# Patient Record
Sex: FEMALE | Race: WHITE | URBAN - NONMETROPOLITAN AREA
[De-identification: names, ages, dates, MRNs, and addresses within clinical notes are randomized per-mention and may not be internally consistent; named-entity substitution may affect disease eponyms.]

---

## 2017-01-13 ENCOUNTER — OFFICE VISIT (OUTPATIENT)
Dept: FAMILY MEDICINE CLINIC | Facility: CLINIC | Age: 27
End: 2017-01-13

## 2017-01-13 VITALS
BODY MASS INDEX: 38.11 KG/M2 | WEIGHT: 240 LBS | HEIGHT: 66.5 IN | OXYGEN SATURATION: 98 % | TEMPERATURE: 99 F | SYSTOLIC BLOOD PRESSURE: 120 MMHG | HEART RATE: 94 BPM | DIASTOLIC BLOOD PRESSURE: 80 MMHG

## 2017-01-13 DIAGNOSIS — M72.2 PLANTAR FASCIITIS OF RIGHT FOOT: Primary | ICD-10-CM

## 2017-01-13 PROCEDURE — 99213 OFFICE O/P EST LOW 20 MIN: CPT | Performed by: FAMILY MEDICINE

## 2017-01-13 NOTE — PROGRESS NOTES
Vee Guzmán is a 31year old female. Patient presents with: Other: check up--hasnt been here in over 1 year. ..room 1      HPI:   Patient has mild pain to her right heel. It is worse when she first gets up.   Gets better after she has been moving about L TM normal, Pharynx normal  NECK: supple, no cervical adenopathy  LUNGS: clear to auscultation  CARDIO: RRR without murmur  ABD soft, nontender, normal BS, no masses, rebound or guarding. No organomegaly or CVA tenderness.   EXTREMITIES: no edema, pain to

## 2017-06-27 ENCOUNTER — TELEPHONE (OUTPATIENT)
Dept: FAMILY MEDICINE CLINIC | Facility: CLINIC | Age: 27
End: 2017-06-27

## 2017-06-27 ENCOUNTER — OFFICE VISIT (OUTPATIENT)
Dept: FAMILY MEDICINE CLINIC | Facility: CLINIC | Age: 27
End: 2017-06-27

## 2017-06-27 VITALS
HEART RATE: 82 BPM | SYSTOLIC BLOOD PRESSURE: 126 MMHG | WEIGHT: 245.38 LBS | TEMPERATURE: 100 F | DIASTOLIC BLOOD PRESSURE: 86 MMHG | OXYGEN SATURATION: 99 % | BODY MASS INDEX: 38.97 KG/M2 | HEIGHT: 66.5 IN

## 2017-06-27 DIAGNOSIS — S69.92XD: Primary | ICD-10-CM

## 2017-06-27 PROCEDURE — 99213 OFFICE O/P EST LOW 20 MIN: CPT | Performed by: FAMILY MEDICINE

## 2017-06-27 NOTE — TELEPHONE ENCOUNTER
Cell phone [phone]    Pt was seen last night in [address] [place] [address] for a swollen finger, had her ring cut off. Finger is still swollen. Urgent care said it looked like a jam. Pt needs to be seen today for work.  Can she be fit in?

## 2017-06-27 NOTE — PROGRESS NOTES
Luna Bloch is a 28year old female. Patient presents with:  Finger Pain: possible workers comp. Imani Carney left ring finger. . pt hit finger at work. . swelling. . burgess adan er to get rings cut off. . room 5      HPI:   Jammed finger at work  States it is better rom      ASSESSMENT AND PLAN:     Jammed interphalangeal joint of finger of left hand, subsequent encounter  (primary encounter diagnosis)    Problem List Items Addressed This Visit     None      Visit Diagnoses     Jammed interphalangeal joint of finger

## 2017-06-27 NOTE — TELEPHONE ENCOUNTER
Patient worked in, left message with family member , who will notify her via Text message.        Future Appointments  Date Time Provider Monica Christian   6/27/2017 2:40 PM Maykel Gonzalez MD [facility] [place]

## 2017-06-29 NOTE — PATIENT INSTRUCTIONS
Finger Sprain  A sprain is a stretching or tearing of the ligaments that hold a joint together. There are no broken bones. Sprains take 3 to 6 weeks to heal.    A sprained finger may be treated with a splint or jackie tape.  This is when you tape the injur Follow up with your healthcare provider as directed. Finger joints will become stiff if immobile for too long. If a splint was applied, ask your healthcare provider when it is safe to begin range-of-motion exercises.   Sometimes fractures don’t show up on t

## 2017-07-10 ENCOUNTER — TELEPHONE (OUTPATIENT)
Dept: FAMILY MEDICINE CLINIC | Facility: CLINIC | Age: 27
End: 2017-07-10

## 2017-07-31 ENCOUNTER — OFFICE VISIT (OUTPATIENT)
Dept: FAMILY MEDICINE CLINIC | Facility: CLINIC | Age: 27
End: 2017-07-31

## 2017-07-31 VITALS
DIASTOLIC BLOOD PRESSURE: 80 MMHG | BODY MASS INDEX: 38 KG/M2 | TEMPERATURE: 100 F | SYSTOLIC BLOOD PRESSURE: 130 MMHG | WEIGHT: 241.13 LBS

## 2017-07-31 DIAGNOSIS — J01.90 ACUTE RHINOSINUSITIS: Primary | ICD-10-CM

## 2017-07-31 PROCEDURE — 99213 OFFICE O/P EST LOW 20 MIN: CPT | Performed by: FAMILY MEDICINE

## 2017-07-31 RX ORDER — AZITHROMYCIN 250 MG/1
TABLET, FILM COATED ORAL
Qty: 6 TABLET | Refills: 0 | Status: SHIPPED | OUTPATIENT
Start: 2017-07-31 | End: 2017-08-05

## 2017-07-31 NOTE — PROGRESS NOTES
Sherry Anderson is a 28year old female. Patient presents with:  Headache: Fever since Friday. . 101.3 --99 degrees. . headache on right side of head and temple since Monday. . bug bite last Sat, red halo around area. .  room 6      HPI:   Fever for 3 days. exertion  CARDIOVASCULAR: denies chest pain on exertion  GI: denies abdominal pain and denies heartburn  NEURO: denies headaches    EXAM:   /80   Temp 100.2 °F (37.9 °C) (Temporal)   Wt 241 lb 2 oz   BMI 38.34 kg/m²  Body mass index is 38.34 kg/m².

## 2017-08-01 ENCOUNTER — TELEPHONE (OUTPATIENT)
Dept: FAMILY MEDICINE CLINIC | Facility: CLINIC | Age: 27
End: 2017-08-01

## 2017-11-04 ENCOUNTER — OFFICE VISIT (OUTPATIENT)
Dept: FAMILY MEDICINE CLINIC | Facility: CLINIC | Age: 27
End: 2017-11-04

## 2017-11-04 VITALS
TEMPERATURE: 98 F | HEIGHT: 66 IN | SYSTOLIC BLOOD PRESSURE: 122 MMHG | OXYGEN SATURATION: 99 % | WEIGHT: 235 LBS | BODY MASS INDEX: 37.77 KG/M2 | DIASTOLIC BLOOD PRESSURE: 80 MMHG

## 2017-11-04 DIAGNOSIS — K21.9 GERD WITHOUT ESOPHAGITIS: ICD-10-CM

## 2017-11-04 DIAGNOSIS — R05.9 COUGH: Primary | ICD-10-CM

## 2017-11-04 PROCEDURE — 99213 OFFICE O/P EST LOW 20 MIN: CPT | Performed by: FAMILY MEDICINE

## 2017-11-04 RX ORDER — ALBUTEROL SULFATE 90 UG/1
2 AEROSOL, METERED RESPIRATORY (INHALATION) EVERY 6 HOURS PRN
Qty: 1 INHALER | Refills: 2 | Status: SHIPPED | OUTPATIENT
Start: 2017-11-04 | End: 2019-09-27

## 2017-11-06 NOTE — PATIENT INSTRUCTIONS
Chronic Cough with Uncertain Cause (Adult)    Everyone has had a cough as part of the common cold, flu, or bronchitis. This kind of cough occurs along with an achy feeling, low-grade fever, nasal and sinus congestion, and a scratchy or sore throat.  This Let your healthcare provider know if you are taking any of these. Asthma  Cough may be the only sign of mild asthma. You may have tests to find out if asthma is causing your cough. You may also take asthma medicine on a trial basis.   Acid reflux (heartbur © 9761-9793 [employer] [address]. [address] [facility], [facility] [place]. All rights reserved. This information is not intended as a substitute for professional medical care. Always follow your healthcare professional's instructions.         Chronic · ACE inhibitors for high blood pressure. These include benazepril, captopril, enalapril, fosinopril, lisinopril, quinapril, ramipril, and others. · Beta-blockers for high blood pressure and other conditions.  These include propranolol, atenolol, metoprolo · Coughing up blood  · Moderate to severe trouble breathing or wheezing  Date Last Reviewed: 9/13/2015  © 5015-8378 [employer] [address]. [address] [facility], [address] [place] [address]. All rights reserved.  This information is not intended as a substitute

## 2017-11-06 NOTE — PROGRESS NOTES
Haley Mane is a 28year old female. Patient presents with:  Wellness Visit: room 5.        HPI:   Here for wewllness    Has been allergic and the inhaler has helped    She needs a fill    They will have to fly to [place]   For a family     Feels wel and throat are clear  NECK: supple,no adenopathy,no bruits  LUNGS: clear to auscultation  CARDIO: RRR without murmur  EXTREMITIES: no cyanosis, clubbing or edema    ASSESSMENT AND PLAN:     Cough  (primary encounter diagnosis)  Gerd without esophagitis

## 2018-07-03 ENCOUNTER — OFFICE VISIT (OUTPATIENT)
Dept: FAMILY MEDICINE CLINIC | Facility: CLINIC | Age: 28
End: 2018-07-03

## 2018-07-03 VITALS
BODY MASS INDEX: 38 KG/M2 | TEMPERATURE: 99 F | DIASTOLIC BLOOD PRESSURE: 80 MMHG | WEIGHT: 236 LBS | HEART RATE: 78 BPM | SYSTOLIC BLOOD PRESSURE: 132 MMHG | OXYGEN SATURATION: 98 %

## 2018-07-03 DIAGNOSIS — J01.90 ACUTE RHINOSINUSITIS: Primary | ICD-10-CM

## 2018-07-03 PROCEDURE — 99213 OFFICE O/P EST LOW 20 MIN: CPT | Performed by: FAMILY MEDICINE

## 2018-07-03 RX ORDER — AZITHROMYCIN 250 MG/1
TABLET, FILM COATED ORAL
Qty: 6 TABLET | Refills: 0 | Status: SHIPPED | OUTPATIENT
Start: 2018-07-03 | End: 2018-07-08

## 2018-07-03 NOTE — PATIENT INSTRUCTIONS
.  Azithromycin tablets  Brand Names: Zithromax, Zithromax Tri-Charlie, Zithromax Z-Charlie  What is this medicine? AZITHROMYCIN (az ith santosh MYE sin) is a macrolide antibiotic. It is used to treat or prevent certain kinds of bacterial infections.  It will not work This medicine may also interact with the following medications:  · amiodarone  · antacids  · birth control pills  · cyclosporine  · digoxin  · magnesium  · nelfinavir  · phenytoin  · warfarin  What if I miss a dose?   If you miss a dose, take it as soon as

## 2018-07-03 NOTE — PROGRESS NOTES
HPI:   Kem Barry is a 33year old female who presents for upper respiratory symptoms for  5  days.  Patient reports sore throat only at the beginning of sx's, congestion, low grade fever, dry cough, cough is not keeping pt up at night, ear pain, sinus rashes,no suspicious lesions  EYES:PERRLA, EOMI,conjunctiva are clear  HEENT: atraumatic, normocephalic,ears are clear,  and throat   Mildly red  No exudate  No petechiae   mild postnasal drainage  NECK: supple,no adenopathy,no bruits  LUNGS: clear to ausc

## 2018-07-06 ENCOUNTER — TELEPHONE (OUTPATIENT)
Dept: FAMILY MEDICINE CLINIC | Facility: CLINIC | Age: 28
End: 2018-07-06

## 2018-07-06 RX ORDER — AMOXICILLIN AND CLAVULANATE POTASSIUM 875; 125 MG/1; MG/1
1 TABLET, FILM COATED ORAL 2 TIMES DAILY
Qty: 20 TABLET | Refills: 0 | Status: SHIPPED | OUTPATIENT
Start: 2018-07-06 | End: 2018-07-16

## 2018-07-06 RX ORDER — PREDNISONE 20 MG/1
TABLET ORAL
Qty: 30 TABLET | Refills: 0 | Status: SHIPPED | OUTPATIENT
Start: 2018-07-06 | End: 2019-09-03 | Stop reason: ALTCHOICE

## 2018-07-06 NOTE — TELEPHONE ENCOUNTER
ANTIBIOTIC IS NOT HELPING, SORE THROAT IS WORSE  PLEASE ADVISE PATIENT CELL  OR MOTHER SHANNEN [phone]

## 2018-07-06 NOTE — TELEPHONE ENCOUNTER
No allergies, change antibiotic to Augmentin 875 mg twice daily 10 days, discontinue Z-Charlie    Add prednisone taper    Prednisone 20mg  3 tabs daily 5 days 2 tabs daily 5 days 1 tab daily 5 days

## 2018-07-06 NOTE — TELEPHONE ENCOUNTER
Taking Tylenol for sinuses as well as Z-Pack. Headache is gone. Right ear starting to bother her. Has dry cough. Sore throat no worse in morning. Feels like she has a lot of drainage. Feels like it's on fire.   Has tried mints, cough drops, gum, but n

## 2019-06-24 ENCOUNTER — OFFICE VISIT (OUTPATIENT)
Dept: FAMILY MEDICINE CLINIC | Facility: CLINIC | Age: 29
End: 2019-06-24
Payer: COMMERCIAL

## 2019-06-24 VITALS
HEIGHT: 65.75 IN | RESPIRATION RATE: 20 BRPM | BODY MASS INDEX: 38.87 KG/M2 | TEMPERATURE: 99 F | WEIGHT: 239 LBS | HEART RATE: 92 BPM | DIASTOLIC BLOOD PRESSURE: 80 MMHG | SYSTOLIC BLOOD PRESSURE: 138 MMHG

## 2019-06-24 DIAGNOSIS — R79.89 ELEVATED LFTS: Primary | ICD-10-CM

## 2019-06-24 DIAGNOSIS — K76.0 FATTY LIVER DISEASE, NONALCOHOLIC: ICD-10-CM

## 2019-06-24 PROCEDURE — 99213 OFFICE O/P EST LOW 20 MIN: CPT | Performed by: FAMILY MEDICINE

## 2019-06-24 NOTE — PROGRESS NOTES
Ju Ayala is a 26year old female. Patient presents with:  Lab Results: done at work , pt has copy in the room .  inrm 5      Chief Complaint Reviewed and Verified  Nursing Notes Reviewed and   Verified  Tobacco Reviewed  Allergies Reviewed  [address] [facility] [address] - 56 U/L 74 (A)       ALLERGIES:  No Known Allergies        Current Outpatient Medications on File Prior to Visit:  predniSONE 20 MG Oral Tab 3 tabs daily 5 days 2 tabs daily 5 days 1 tab daily 5 days Disp: 30 tablet Rfl: 0   Albuterol Sulfate  ( apparent distress  SKIN: no rashes,no suspicious lesions  No pallor jaundice  No icterus  HEENT: atraumatic, normocephalic,ears and throat are clear    GI: good BS's,no masses, HSM or tenderness  EXTREMITIES: no cyanosis, clubbing or edema     ASSESSMENT A

## 2019-09-03 ENCOUNTER — OFFICE VISIT (OUTPATIENT)
Dept: FAMILY MEDICINE CLINIC | Facility: CLINIC | Age: 29
End: 2019-09-03
Payer: COMMERCIAL

## 2019-09-03 VITALS
SYSTOLIC BLOOD PRESSURE: 128 MMHG | WEIGHT: 237.13 LBS | OXYGEN SATURATION: 99 % | BODY MASS INDEX: 39 KG/M2 | RESPIRATION RATE: 22 BRPM | HEART RATE: 96 BPM | TEMPERATURE: 99 F | DIASTOLIC BLOOD PRESSURE: 80 MMHG

## 2019-09-03 DIAGNOSIS — J01.40 ACUTE NON-RECURRENT PANSINUSITIS: Primary | ICD-10-CM

## 2019-09-03 PROCEDURE — 99213 OFFICE O/P EST LOW 20 MIN: CPT | Performed by: NURSE PRACTITIONER

## 2019-09-03 RX ORDER — AMOXICILLIN AND CLAVULANATE POTASSIUM 875; 125 MG/1; MG/1
1 TABLET, FILM COATED ORAL 2 TIMES DAILY
Qty: 20 TABLET | Refills: 0 | Status: SHIPPED | OUTPATIENT
Start: 2019-09-03 | End: 2019-09-13

## 2019-09-03 NOTE — PROGRESS NOTES
HPI:   Nasal Congestion   This is a new problem. Episode onset: Saturday. The problem has been gradually worsening. Associated symptoms include congestion, coughing, fatigue, a sore throat and swollen glands.  Pertinent negatives include no chest pain, ch pain and palpitations. Musculoskeletal: Negative for myalgias.         EXAM:   /80 (BP Location: Right arm, Patient Position: Sitting, Cuff Size: large)   Pulse 96   Temp 98.5 °F (36.9 °C) (Temporal)   Resp 22   Wt 237 lb 2 oz   SpO2 99%   BMI 38.56

## 2019-09-27 ENCOUNTER — OFFICE VISIT (OUTPATIENT)
Dept: FAMILY MEDICINE CLINIC | Facility: CLINIC | Age: 29
End: 2019-09-27
Payer: COMMERCIAL

## 2019-09-27 VITALS
SYSTOLIC BLOOD PRESSURE: 130 MMHG | DIASTOLIC BLOOD PRESSURE: 80 MMHG | HEIGHT: 65.75 IN | TEMPERATURE: 98 F | BODY MASS INDEX: 38.73 KG/M2 | HEART RATE: 80 BPM | WEIGHT: 238.13 LBS

## 2019-09-27 DIAGNOSIS — J01.00 ACUTE NON-RECURRENT MAXILLARY SINUSITIS: Primary | ICD-10-CM

## 2019-09-27 PROCEDURE — 99213 OFFICE O/P EST LOW 20 MIN: CPT | Performed by: FAMILY MEDICINE

## 2019-09-27 RX ORDER — ALBUTEROL SULFATE 90 UG/1
2 AEROSOL, METERED RESPIRATORY (INHALATION) EVERY 6 HOURS PRN
Qty: 1 INHALER | Refills: 2 | Status: SHIPPED | OUTPATIENT
Start: 2019-09-27

## 2019-09-27 RX ORDER — AMOXICILLIN 875 MG/1
875 TABLET, COATED ORAL 2 TIMES DAILY
Qty: 20 TABLET | Refills: 0 | Status: SHIPPED | OUTPATIENT
Start: 2019-09-27 | End: 2019-12-14 | Stop reason: ALTCHOICE

## 2019-09-27 NOTE — PROGRESS NOTES
Dahiana Brewer is a 26year old female. Patient presents with:  Sore Throat: left side throat pain; right ear pain; started Sunday with sore throat. . room 1      HPI:   C/o cough, sore throat, sinus pressure, ear pressure. No fever, chills, sweats.  No wh nontender, normal BS, no masses, rebound or guarding. No organomegaly or CVA tenderness.   EXTREMITIES: no edema          ASSESSMENT AND PLAN:     Acute non-recurrent maxillary sinusitis  (primary encounter diagnosis)    Explained majority of upper respirat

## 2019-10-28 ENCOUNTER — MED REC SCAN ONLY (OUTPATIENT)
Dept: FAMILY MEDICINE CLINIC | Facility: CLINIC | Age: 29
End: 2019-10-28

## 2019-12-14 ENCOUNTER — OFFICE VISIT (OUTPATIENT)
Dept: FAMILY MEDICINE CLINIC | Facility: CLINIC | Age: 29
End: 2019-12-14
Payer: COMMERCIAL

## 2019-12-14 VITALS
HEART RATE: 84 BPM | DIASTOLIC BLOOD PRESSURE: 72 MMHG | WEIGHT: 232.13 LBS | TEMPERATURE: 98 F | SYSTOLIC BLOOD PRESSURE: 118 MMHG | BODY MASS INDEX: 36.86 KG/M2 | RESPIRATION RATE: 14 BRPM | HEIGHT: 66.5 IN

## 2019-12-14 DIAGNOSIS — R79.89 ELEVATED LFTS: ICD-10-CM

## 2019-12-14 DIAGNOSIS — E66.9 OBESITY (BMI 35.0-39.9 WITHOUT COMORBIDITY): ICD-10-CM

## 2019-12-14 DIAGNOSIS — K76.0 FATTY LIVER DISEASE, NONALCOHOLIC: Primary | ICD-10-CM

## 2019-12-14 PROCEDURE — 80053 COMPREHEN METABOLIC PANEL: CPT | Performed by: FAMILY MEDICINE

## 2019-12-14 PROCEDURE — 99213 OFFICE O/P EST LOW 20 MIN: CPT | Performed by: FAMILY MEDICINE

## 2019-12-14 PROCEDURE — 36415 COLL VENOUS BLD VENIPUNCTURE: CPT | Performed by: FAMILY MEDICINE

## 2019-12-14 NOTE — PROGRESS NOTES
Jocelynn Adair is a 26year old female.   Patient presents with:  Lab: room 5      Chief Complaint Reviewed and Verified  Nursing Notes Reviewed and   Verified  Tobacco Reviewed  Allergies Reviewed  Medications Reviewed    Problem List Reviewed  Medical Hi shortness of breath with exertion  CARDIOVASCULAR: denies chest pain on exertion  GI: denies abdominal pain and denies heartburn  NEURO: denies headaches     Objective   EXAM:   /72 (BP Location: Right arm, Patient Position: Sitting, Cuff Size: large

## 2019-12-16 DIAGNOSIS — K76.0 FATTY LIVER DISEASE, NONALCOHOLIC: Primary | ICD-10-CM

## 2020-01-10 ENCOUNTER — OFFICE VISIT (OUTPATIENT)
Dept: FAMILY MEDICINE CLINIC | Facility: CLINIC | Age: 30
End: 2020-01-10
Payer: COMMERCIAL

## 2020-01-10 VITALS
TEMPERATURE: 98 F | OXYGEN SATURATION: 98 % | WEIGHT: 238.38 LBS | HEIGHT: 66.5 IN | SYSTOLIC BLOOD PRESSURE: 130 MMHG | BODY MASS INDEX: 37.86 KG/M2 | HEART RATE: 80 BPM | DIASTOLIC BLOOD PRESSURE: 80 MMHG

## 2020-01-10 DIAGNOSIS — J01.40 ACUTE NON-RECURRENT PANSINUSITIS: Primary | ICD-10-CM

## 2020-01-10 PROCEDURE — 99213 OFFICE O/P EST LOW 20 MIN: CPT | Performed by: FAMILY MEDICINE

## 2020-01-10 RX ORDER — AMOXICILLIN 875 MG/1
875 TABLET, COATED ORAL 2 TIMES DAILY
Qty: 20 TABLET | Refills: 0 | Status: SHIPPED | OUTPATIENT
Start: 2020-01-10 | End: 2020-06-27 | Stop reason: ALTCHOICE

## 2020-01-10 NOTE — PROGRESS NOTES
Matteo La     is a 26year old female. Patient presents with:  Ear Pain: RT ear pain, started this am- pain goes down neck. ..room 1  Sinus Problem: also pressure in sinuses      HPI:   Sinus congestion, pnd, sore throat, ear ache, cough.   Albuterol L TM normal, Pharynx with PND  NECK: supple, no cervical adenopathy  LUNGS: clear to auscultation  CARDIO: RRR without murmur.   EXTREMITIES: no edema          ASSESSMENT AND PLAN:     Acute non-recurrent pansinusitis  (primary encounter diagnosis)    Treat

## 2020-06-27 ENCOUNTER — OFFICE VISIT (OUTPATIENT)
Dept: FAMILY MEDICINE CLINIC | Facility: CLINIC | Age: 30
End: 2020-06-27
Payer: COMMERCIAL

## 2020-06-27 VITALS
TEMPERATURE: 99 F | DIASTOLIC BLOOD PRESSURE: 84 MMHG | HEIGHT: 66.5 IN | BODY MASS INDEX: 37.66 KG/M2 | OXYGEN SATURATION: 98 % | HEART RATE: 94 BPM | WEIGHT: 237.13 LBS | SYSTOLIC BLOOD PRESSURE: 138 MMHG | RESPIRATION RATE: 12 BRPM

## 2020-06-27 DIAGNOSIS — K21.9 GERD WITHOUT ESOPHAGITIS: ICD-10-CM

## 2020-06-27 DIAGNOSIS — S33.9XXA SPRAIN OF LIGAMENT OF LUMBOSACRAL JOINT, INITIAL ENCOUNTER: Primary | ICD-10-CM

## 2020-06-27 DIAGNOSIS — R79.89 ELEVATED LFTS: ICD-10-CM

## 2020-06-27 DIAGNOSIS — K76.0 FATTY LIVER DISEASE, NONALCOHOLIC: ICD-10-CM

## 2020-06-27 DIAGNOSIS — E66.9 OBESITY (BMI 35.0-39.9 WITHOUT COMORBIDITY): ICD-10-CM

## 2020-06-27 DIAGNOSIS — Z79.899 ENCOUNTER FOR LONG-TERM (CURRENT) USE OF MEDICATIONS: ICD-10-CM

## 2020-06-27 PROCEDURE — 36415 COLL VENOUS BLD VENIPUNCTURE: CPT | Performed by: FAMILY MEDICINE

## 2020-06-27 PROCEDURE — 80053 COMPREHEN METABOLIC PANEL: CPT | Performed by: FAMILY MEDICINE

## 2020-06-27 PROCEDURE — 99213 OFFICE O/P EST LOW 20 MIN: CPT | Performed by: FAMILY MEDICINE

## 2020-06-27 RX ORDER — CYCLOBENZAPRINE HCL 10 MG
10 TABLET ORAL 2 TIMES DAILY
Qty: 40 TABLET | Refills: 0 | Status: SHIPPED | OUTPATIENT
Start: 2020-06-27 | End: 2020-07-17

## 2020-06-27 NOTE — PATIENT INSTRUCTIONS
Cyclobenzaprine tablets  Brand Names: Fexmid, Flexeril  What is this medicine? CYCLOBENZAPRINE (sye kloe DONN za preen) is a muscle relaxer. It is used to treat muscle pain, spasms, and stiffness. How should I use this medicine?   Take this medicine by darci · certain medicines for infection like alfuzosin, chloroquine, clarithromycin, levofloxacin, mefloquine, pentamidine, troleandomycin  · certain medicines for irregular heart beat  · certain medicines for seizures like phenobarbital, primidone  · contrast d You may get drowsy or dizzy. Do not drive, use machinery, or do anything that needs mental alertness until you know how this medicine affects you. Do not stand or sit up quickly, especially if you are an older patient.  This reduces the risk of dizzy or gabi

## 2020-06-27 NOTE — PROGRESS NOTES
Jennifer Montano is a 31year old female. Patient presents with:  Test Results: .inrm.  8      Chief Complaint Reviewed and Verified  Nursing Notes Reviewed and   Verified  Tobacco Reviewed  Allergies Reviewed  Medications Reviewed    Problem List Reviewed (108 kg)  09/03/19 : 237 lb 2 oz (107.6 kg)  06/24/19 : 239 lb (108.4 kg)    Results for orders placed or performed in visit on 97/52/13   COMP METABOLIC PANEL (14)    Collection Time: 12/14/19  9:24 AM   Result Value Ref Range    Glucose 88 70 - 99 mg/dL Addressed This Visit        Unprioritized    Fatty liver disease, nonalcoholic    Current Assessment & Plan     Check labs today  No more than 4 g tylenol a day         Relevant Orders    COMP METABOLIC PANEL (14)      Other Visit Diagnoses     Sprain of l

## 2020-06-30 DIAGNOSIS — K76.0 FATTY LIVER DISEASE, NONALCOHOLIC: Primary | ICD-10-CM

## 2020-06-30 DIAGNOSIS — R79.89 ELEVATED LFTS: ICD-10-CM

## 2021-01-23 ENCOUNTER — OFFICE VISIT (OUTPATIENT)
Dept: FAMILY MEDICINE CLINIC | Facility: CLINIC | Age: 31
End: 2021-01-23
Payer: COMMERCIAL

## 2021-01-23 VITALS
OXYGEN SATURATION: 99 % | RESPIRATION RATE: 16 BRPM | BODY MASS INDEX: 37.08 KG/M2 | DIASTOLIC BLOOD PRESSURE: 78 MMHG | HEIGHT: 66.6 IN | WEIGHT: 233.5 LBS | HEART RATE: 88 BPM | TEMPERATURE: 98 F | SYSTOLIC BLOOD PRESSURE: 130 MMHG

## 2021-01-23 DIAGNOSIS — K76.0 FATTY LIVER DISEASE, NONALCOHOLIC: ICD-10-CM

## 2021-01-23 DIAGNOSIS — H65.01 NON-RECURRENT ACUTE SEROUS OTITIS MEDIA OF RIGHT EAR: Primary | ICD-10-CM

## 2021-01-23 LAB
ALBUMIN SERPL-MCNC: 3.7 G/DL (ref 3.4–5)
ALBUMIN/GLOB SERPL: 0.9 {RATIO} (ref 1–2)
ALP LIVER SERPL-CCNC: 38 U/L
ALT SERPL-CCNC: 72 U/L
ANION GAP SERPL CALC-SCNC: 5 MMOL/L (ref 0–18)
AST SERPL-CCNC: 38 U/L (ref 15–37)
BILIRUB SERPL-MCNC: 0.4 MG/DL (ref 0.1–2)
BUN BLD-MCNC: 10 MG/DL (ref 7–18)
BUN/CREAT SERPL: 12.2 (ref 10–20)
CALCIUM BLD-MCNC: 9.2 MG/DL (ref 8.5–10.1)
CHLORIDE SERPL-SCNC: 109 MMOL/L (ref 98–112)
CO2 SERPL-SCNC: 27 MMOL/L (ref 21–32)
CREAT BLD-MCNC: 0.82 MG/DL
GGT SERPL-CCNC: 33 U/L
GLOBULIN PLAS-MCNC: 4 G/DL (ref 2.8–4.4)
GLUCOSE BLD-MCNC: 90 MG/DL (ref 70–99)
M PROTEIN MFR SERPL ELPH: 7.7 G/DL (ref 6.4–8.2)
OSMOLALITY SERPL CALC.SUM OF ELEC: 291 MOSM/KG (ref 275–295)
PATIENT FASTING Y/N/NP: YES
POTASSIUM SERPL-SCNC: 4.3 MMOL/L (ref 3.5–5.1)
SODIUM SERPL-SCNC: 141 MMOL/L (ref 136–145)

## 2021-01-23 PROCEDURE — 36415 COLL VENOUS BLD VENIPUNCTURE: CPT | Performed by: FAMILY MEDICINE

## 2021-01-23 PROCEDURE — 82977 ASSAY OF GGT: CPT | Performed by: FAMILY MEDICINE

## 2021-01-23 PROCEDURE — 99213 OFFICE O/P EST LOW 20 MIN: CPT | Performed by: FAMILY MEDICINE

## 2021-01-23 PROCEDURE — 3075F SYST BP GE 130 - 139MM HG: CPT | Performed by: FAMILY MEDICINE

## 2021-01-23 PROCEDURE — 3078F DIAST BP <80 MM HG: CPT | Performed by: FAMILY MEDICINE

## 2021-01-23 PROCEDURE — 3008F BODY MASS INDEX DOCD: CPT | Performed by: FAMILY MEDICINE

## 2021-01-23 PROCEDURE — 80053 COMPREHEN METABOLIC PANEL: CPT | Performed by: FAMILY MEDICINE

## 2021-01-23 RX ORDER — PREDNISONE 20 MG/1
TABLET ORAL
Qty: 15 TABLET | Refills: 0 | Status: SHIPPED | OUTPATIENT
Start: 2021-01-23 | End: 2021-02-02

## 2021-01-23 RX ORDER — SULFAMETHOXAZOLE AND TRIMETHOPRIM 400; 80 MG/1; MG/1
1 TABLET ORAL 2 TIMES DAILY
Qty: 20 TABLET | Refills: 0 | Status: SHIPPED | OUTPATIENT
Start: 2021-01-23 | End: 2021-02-02

## 2021-01-23 NOTE — PROGRESS NOTES
Pooja Gupta is a 27year old female. Patient presents with:   Follow - Up: Follow up on liver and she needs right ear looked at feels like pressure in ear       Chief Complaint Reviewed and Verified  Nursing Notes Reviewed and   Verified  Tobacco Revie complaints  SKIN: denies any unusual skin lesions or rashes  ENT see HPI    RESPIRATORY: denies shortness of breath with exertion  CARDIOVASCULAR: denies chest pain on exertion  GI: denies abdominal pain and denies heartburn  NEURO: denies headaches     Ob issues and agrees to the plan.

## 2021-05-20 LAB
AMB EXT CHOLESTEROL, TOTAL: 181 MG/DL
AMB EXT HDL CHOLESTEROL: 68 MG/DL
AMB EXT LDL CHOLESTEROL, DIRECT: 85 MG/DL
AMB EXT TRIGLYCERIDES: 139 MG/DL

## 2021-07-10 ENCOUNTER — OFFICE VISIT (OUTPATIENT)
Dept: FAMILY MEDICINE CLINIC | Facility: CLINIC | Age: 31
End: 2021-07-10
Payer: COMMERCIAL

## 2021-07-10 VITALS
WEIGHT: 239.25 LBS | DIASTOLIC BLOOD PRESSURE: 78 MMHG | HEART RATE: 86 BPM | RESPIRATION RATE: 16 BRPM | HEIGHT: 66.6 IN | BODY MASS INDEX: 38 KG/M2 | SYSTOLIC BLOOD PRESSURE: 128 MMHG | TEMPERATURE: 98 F | OXYGEN SATURATION: 99 %

## 2021-07-10 DIAGNOSIS — R79.89 ELEVATED LFTS: Primary | ICD-10-CM

## 2021-07-10 DIAGNOSIS — K76.0 FATTY LIVER DISEASE, NONALCOHOLIC: ICD-10-CM

## 2021-07-10 DIAGNOSIS — F43.9 STRESS AT HOME: ICD-10-CM

## 2021-07-10 PROCEDURE — 99213 OFFICE O/P EST LOW 20 MIN: CPT | Performed by: FAMILY MEDICINE

## 2021-07-10 PROCEDURE — 3078F DIAST BP <80 MM HG: CPT | Performed by: FAMILY MEDICINE

## 2021-07-10 PROCEDURE — 3008F BODY MASS INDEX DOCD: CPT | Performed by: FAMILY MEDICINE

## 2021-07-10 PROCEDURE — 3074F SYST BP LT 130 MM HG: CPT | Performed by: FAMILY MEDICINE

## 2021-07-10 NOTE — PROGRESS NOTES
HPI/Subjective:   Dahiana Brewer is a 27year old female who presents for Follow - Up (6 month follow up on liver .)     Here for 2 reasons  1  Follow up from elevated liver function tests  She has been pushing fluids and dieting  Weight not necessarily l Assessment & Plan:   1. Elevated LFTs (Primary)  2. Fatty liver disease, nonalcoholic  Assessment & Plan:  improved numbers  Continue weight loss plan  Check 6 months    3.  Stress at home  -     [address] [facility] MD [place], 7/10/2021, 10:1

## 2021-07-12 ENCOUNTER — TELEPHONE (OUTPATIENT)
Dept: FAMILY MEDICINE CLINIC | Facility: CLINIC | Age: 31
End: 2021-07-12

## 2022-01-08 ENCOUNTER — OFFICE VISIT (OUTPATIENT)
Dept: FAMILY MEDICINE CLINIC | Facility: CLINIC | Age: 32
End: 2022-01-08
Payer: COMMERCIAL

## 2022-01-08 VITALS
WEIGHT: 246.38 LBS | SYSTOLIC BLOOD PRESSURE: 116 MMHG | OXYGEN SATURATION: 97 % | TEMPERATURE: 100 F | HEIGHT: 67 IN | RESPIRATION RATE: 16 BRPM | DIASTOLIC BLOOD PRESSURE: 70 MMHG | BODY MASS INDEX: 38.67 KG/M2 | HEART RATE: 86 BPM

## 2022-01-08 DIAGNOSIS — E66.9 OBESITY (BMI 35.0-39.9 WITHOUT COMORBIDITY): ICD-10-CM

## 2022-01-08 DIAGNOSIS — K76.0 FATTY LIVER DISEASE, NONALCOHOLIC: Primary | ICD-10-CM

## 2022-01-08 DIAGNOSIS — R79.89 ELEVATED LFTS: ICD-10-CM

## 2022-01-08 LAB
ALBUMIN SERPL-MCNC: 3.9 G/DL (ref 3.4–5)
ALBUMIN/GLOB SERPL: 1.1 {RATIO} (ref 1–2)
ALP LIVER SERPL-CCNC: 44 U/L
ALT SERPL-CCNC: 49 U/L
ANION GAP SERPL CALC-SCNC: 9 MMOL/L (ref 0–18)
AST SERPL-CCNC: 23 U/L (ref 15–37)
BILIRUB SERPL-MCNC: 0.4 MG/DL (ref 0.1–2)
BUN BLD-MCNC: 11 MG/DL (ref 7–18)
CALCIUM BLD-MCNC: 9 MG/DL (ref 8.5–10.1)
CHLORIDE SERPL-SCNC: 109 MMOL/L (ref 98–112)
CO2 SERPL-SCNC: 23 MMOL/L (ref 21–32)
CREAT BLD-MCNC: 0.73 MG/DL
FASTING STATUS PATIENT QL REPORTED: NO
GGT SERPL-CCNC: 55 U/L
GLOBULIN PLAS-MCNC: 3.6 G/DL (ref 2.8–4.4)
GLUCOSE BLD-MCNC: 145 MG/DL (ref 70–99)
OSMOLALITY SERPL CALC.SUM OF ELEC: 294 MOSM/KG (ref 275–295)
POTASSIUM SERPL-SCNC: 4.3 MMOL/L (ref 3.5–5.1)
PROT SERPL-MCNC: 7.5 G/DL (ref 6.4–8.2)
SODIUM SERPL-SCNC: 141 MMOL/L (ref 136–145)

## 2022-01-08 PROCEDURE — 99213 OFFICE O/P EST LOW 20 MIN: CPT | Performed by: FAMILY MEDICINE

## 2022-01-08 PROCEDURE — 3008F BODY MASS INDEX DOCD: CPT | Performed by: FAMILY MEDICINE

## 2022-01-08 PROCEDURE — 82977 ASSAY OF GGT: CPT | Performed by: FAMILY MEDICINE

## 2022-01-08 PROCEDURE — 3074F SYST BP LT 130 MM HG: CPT | Performed by: FAMILY MEDICINE

## 2022-01-08 PROCEDURE — 3078F DIAST BP <80 MM HG: CPT | Performed by: FAMILY MEDICINE

## 2022-01-08 PROCEDURE — 80053 COMPREHEN METABOLIC PANEL: CPT | Performed by: FAMILY MEDICINE

## 2022-01-10 NOTE — PROGRESS NOTES
Subjective:   Emily Hamilton is a 27year old female who presents for Liver Enzymes (fup. ...room 6)   Here for regular follow up on the liver function tests elevation she has had    No recent labs    She has gained some weight recently    No fever  No larisa Panel (14)  3.  Obesity (BMI 35.0-39.9 without comorbidity)          Cephas Galeazzi Gleason, MD, 1/9/2022, 6:01 PM

## 2022-10-24 ENCOUNTER — MED REC SCAN ONLY (OUTPATIENT)
Dept: FAMILY MEDICINE CLINIC | Facility: CLINIC | Age: 32
End: 2022-10-24

## 2023-04-08 ENCOUNTER — OFFICE VISIT (OUTPATIENT)
Dept: FAMILY MEDICINE CLINIC | Facility: CLINIC | Age: 33
End: 2023-04-08
Payer: COMMERCIAL

## 2023-04-08 VITALS
OXYGEN SATURATION: 98 % | SYSTOLIC BLOOD PRESSURE: 118 MMHG | HEIGHT: 66.25 IN | TEMPERATURE: 99 F | WEIGHT: 247.38 LBS | HEART RATE: 73 BPM | RESPIRATION RATE: 12 BRPM | BODY MASS INDEX: 39.76 KG/M2 | DIASTOLIC BLOOD PRESSURE: 60 MMHG

## 2023-04-08 DIAGNOSIS — Z00.00 WELL ADULT EXAM: Primary | ICD-10-CM

## 2023-04-08 DIAGNOSIS — K76.0 FATTY LIVER DISEASE, NONALCOHOLIC: ICD-10-CM

## 2023-04-08 DIAGNOSIS — E66.9 OBESITY (BMI 35.0-39.9 WITHOUT COMORBIDITY): ICD-10-CM

## 2023-04-08 DIAGNOSIS — R79.89 ELEVATED LFTS: ICD-10-CM

## 2023-04-08 LAB
ALBUMIN SERPL-MCNC: 3.6 G/DL (ref 3.4–5)
ALBUMIN/GLOB SERPL: 0.9 {RATIO} (ref 1–2)
ALP LIVER SERPL-CCNC: 39 U/L
ALT SERPL-CCNC: 47 U/L
ANION GAP SERPL CALC-SCNC: 4 MMOL/L (ref 0–18)
AST SERPL-CCNC: 26 U/L (ref 15–37)
BILIRUB SERPL-MCNC: 0.4 MG/DL (ref 0.1–2)
BUN BLD-MCNC: 11 MG/DL (ref 7–18)
CALCIUM BLD-MCNC: 9.1 MG/DL (ref 8.5–10.1)
CHLORIDE SERPL-SCNC: 109 MMOL/L (ref 98–112)
CO2 SERPL-SCNC: 26 MMOL/L (ref 21–32)
CREAT BLD-MCNC: 0.76 MG/DL
GFR SERPLBLD BASED ON 1.73 SQ M-ARVRAT: 105 ML/MIN/1.73M2 (ref 60–?)
GLOBULIN PLAS-MCNC: 3.8 G/DL (ref 2.8–4.4)
GLUCOSE BLD-MCNC: 107 MG/DL (ref 70–99)
OSMOLALITY SERPL CALC.SUM OF ELEC: 288 MOSM/KG (ref 275–295)
POTASSIUM SERPL-SCNC: 4.1 MMOL/L (ref 3.5–5.1)
PROT SERPL-MCNC: 7.4 G/DL (ref 6.4–8.2)
SODIUM SERPL-SCNC: 139 MMOL/L (ref 136–145)

## 2023-04-08 PROCEDURE — 99395 PREV VISIT EST AGE 18-39: CPT | Performed by: FAMILY MEDICINE

## 2023-04-08 PROCEDURE — 3078F DIAST BP <80 MM HG: CPT | Performed by: FAMILY MEDICINE

## 2023-04-08 PROCEDURE — 3074F SYST BP LT 130 MM HG: CPT | Performed by: FAMILY MEDICINE

## 2023-04-08 PROCEDURE — 3008F BODY MASS INDEX DOCD: CPT | Performed by: FAMILY MEDICINE

## 2023-04-08 PROCEDURE — 80053 COMPREHEN METABOLIC PANEL: CPT | Performed by: FAMILY MEDICINE

## 2023-04-10 DIAGNOSIS — K76.0 FATTY LIVER DISEASE, NONALCOHOLIC: Primary | ICD-10-CM

## 2023-04-10 DIAGNOSIS — R79.89 ELEVATED LFTS: ICD-10-CM

## 2024-01-15 ENCOUNTER — OFFICE VISIT (OUTPATIENT)
Dept: FAMILY MEDICINE CLINIC | Facility: CLINIC | Age: 34
End: 2024-01-15
Payer: COMMERCIAL

## 2024-01-15 VITALS
DIASTOLIC BLOOD PRESSURE: 66 MMHG | SYSTOLIC BLOOD PRESSURE: 116 MMHG | BODY MASS INDEX: 39 KG/M2 | RESPIRATION RATE: 18 BRPM | HEART RATE: 99 BPM | TEMPERATURE: 99 F | OXYGEN SATURATION: 99 % | WEIGHT: 245 LBS

## 2024-01-15 DIAGNOSIS — H66.001 NON-RECURRENT ACUTE SUPPURATIVE OTITIS MEDIA OF RIGHT EAR WITHOUT SPONTANEOUS RUPTURE OF TYMPANIC MEMBRANE: Primary | ICD-10-CM

## 2024-01-15 PROCEDURE — 99213 OFFICE O/P EST LOW 20 MIN: CPT

## 2024-01-15 PROCEDURE — 3078F DIAST BP <80 MM HG: CPT

## 2024-01-15 PROCEDURE — 3074F SYST BP LT 130 MM HG: CPT

## 2024-01-15 RX ORDER — AMOXICILLIN AND CLAVULANATE POTASSIUM 875; 125 MG/1; MG/1
1 TABLET, FILM COATED ORAL 2 TIMES DAILY
Qty: 20 TABLET | Refills: 0 | Status: SHIPPED | OUTPATIENT
Start: 2024-01-15 | End: 2024-01-25

## 2024-01-15 RX ORDER — DROSPIRENONE AND ETHINYL ESTRADIOL 0.03MG-3MG
1 KIT ORAL DAILY
COMMUNITY
Start: 2023-12-13

## 2024-01-15 NOTE — PROGRESS NOTES
HPI:   Aurelia is a 33yr. Old female here today with right ear pain that began last night and a scratchy throat that began 3 days ago.  Per patient throat discomfort has resolved.  Right ear began hurting last night.  She applied a heating pad and took some over the counter analgesics.  Patient denies fever, chills, congestion, difficulty swallowing, cough, chest tightness, shortness of breath.          Current Outpatient Medications   Medication Sig Dispense Refill    drospirenone-ethinyl estradiol 3-0.03 MG Oral Tab Take 1 tablet by mouth daily.      amoxicillin clavulanate 875-125 MG Oral Tab Take 1 tablet by mouth 2 (two) times daily for 10 days. 20 tablet 0    Albuterol Sulfate  (90 Base) MCG/ACT Inhalation Aero Soln Inhale 2 puffs into the lungs every 6 (six) hours as needed for Wheezing. 1 Inhaler 2    Esomeprazole Magnesium 20 MG Oral Capsule Delayed Release Take 1 capsule (20 mg total) by mouth every morning before breakfast.      Multiple Vitamins-Minerals (MULTIVITAMIN OR) Take  by mouth.        Past Medical History:   Diagnosis Date    Fatty liver disease, nonalcoholic       Past Surgical History:   Procedure Laterality Date    APPENDECTOMY              Family History   Problem Relation Age of Onset    Other (Other[other]) Mother         GERD    Cancer Maternal Grandmother 50        [place] colon cancer    Heart Disorder Maternal Grandfather       Social History     Socioeconomic History    Marital status:    Tobacco Use    Smoking status: Never    Smokeless tobacco: Never   Vaping Use    Vaping Use: Never used   Substance and Sexual Activity    Alcohol use: No     Alcohol/week: 0.0 standard drinks of alcohol    Drug use: No    Sexual activity: Yes     Birth control/protection: Pill   Social History Narrative    ** Merged History Encounter **              REVIEW OF SYSTEMS:   Review of Systems   Constitutional:  Negative for chills and fever.   HENT:  Positive for ear pain (right see hpi).  Negative for congestion, ear discharge, postnasal drip, rhinorrhea, sinus pressure, sore throat and trouble swallowing.    Eyes: Negative.    Respiratory:  Negative for cough, chest tightness and shortness of breath.    Cardiovascular:  Negative for chest pain and palpitations.   Gastrointestinal:  Negative for diarrhea, nausea and vomiting.   Neurological:  Negative for dizziness, light-headedness and headaches.       EXAM:   /66 (BP Location: Left arm, Patient Position: Sitting, Cuff Size: adult)   Pulse 99   Temp 98.7 °F (37.1 °C) (Temporal)   Resp 18   Wt 245 lb (111.1 kg)   LMP 03/11/2023   SpO2 99%   BMI 39.25 kg/m²   Physical Exam  Vitals and nursing note reviewed.   Constitutional:       General: She is not in acute distress.  HENT:      Head: Atraumatic.      Right Ear: Ear canal and external ear normal. Tympanic membrane is erythematous and bulging.      Left Ear: Tympanic membrane, ear canal and external ear normal.      Nose: Nose normal.      Mouth/Throat:      Mouth: Mucous membranes are moist.      Pharynx: Oropharynx is clear.   Eyes:      General:         Right eye: No discharge.         Left eye: No discharge.      Conjunctiva/sclera: Conjunctivae normal.   Cardiovascular:      Rate and Rhythm: Normal rate and regular rhythm.      Pulses: Normal pulses.      Heart sounds: Normal heart sounds.   Pulmonary:      Effort: Pulmonary effort is normal.      Breath sounds: Normal breath sounds. No wheezing, rhonchi or rales.   Musculoskeletal:      Cervical back: Neck supple.   Lymphadenopathy:      Cervical: No cervical adenopathy.   Skin:     General: Skin is warm and dry.   Neurological:      General: No focal deficit present.      Mental Status: She is alert.         ASSESSMENT AND PLAN:   Diagnoses and all orders for this visit:    Non-recurrent acute suppurative otitis media of right ear without spontaneous rupture of tympanic membrane  -     amoxicillin clavulanate 875-125 MG Oral Tab;  Take 1 tablet by mouth 2 (two) times daily for 10 days.    -Discussed medication as prescribed.  Recommend over the counter analgesics for pain or discomfort.  -Return for worsening, call with questions or problems.  -Patient verbalized understanding and in agreement with treatment plan.    María Diaz, APRN

## 2024-05-16 ENCOUNTER — LABORATORY ENCOUNTER (OUTPATIENT)
Dept: LAB | Age: 34
End: 2024-05-16
Attending: FAMILY MEDICINE

## 2024-05-16 ENCOUNTER — OFFICE VISIT (OUTPATIENT)
Dept: FAMILY MEDICINE CLINIC | Facility: CLINIC | Age: 34
End: 2024-05-16

## 2024-05-16 VITALS
WEIGHT: 200.25 LBS | OXYGEN SATURATION: 98 % | HEIGHT: 65.75 IN | TEMPERATURE: 98 F | HEART RATE: 78 BPM | DIASTOLIC BLOOD PRESSURE: 60 MMHG | BODY MASS INDEX: 32.57 KG/M2 | RESPIRATION RATE: 12 BRPM | SYSTOLIC BLOOD PRESSURE: 110 MMHG

## 2024-05-16 DIAGNOSIS — Z13.0 SCREENING FOR IRON DEFICIENCY ANEMIA: ICD-10-CM

## 2024-05-16 DIAGNOSIS — Z13.6 SCREENING FOR CARDIOVASCULAR CONDITION: ICD-10-CM

## 2024-05-16 DIAGNOSIS — Z13.1 SCREENING FOR DIABETES MELLITUS: ICD-10-CM

## 2024-05-16 DIAGNOSIS — Z13.29 SCREENING FOR THYROID DISORDER: ICD-10-CM

## 2024-05-16 DIAGNOSIS — K76.0 FATTY LIVER DISEASE, NONALCOHOLIC: ICD-10-CM

## 2024-05-16 DIAGNOSIS — Z00.00 LABORATORY EXAMINATION ORDERED AS PART OF A ROUTINE GENERAL MEDICAL EXAMINATION: ICD-10-CM

## 2024-05-16 DIAGNOSIS — Z00.00 WELL ADULT EXAM: Primary | ICD-10-CM

## 2024-05-16 DIAGNOSIS — E66.9 OBESITY (BMI 30.0-34.9): ICD-10-CM

## 2024-05-16 DIAGNOSIS — Z11.59 ENCOUNTER FOR HEPATITIS C SCREENING TEST FOR LOW RISK PATIENT: ICD-10-CM

## 2024-05-16 LAB
ALBUMIN SERPL-MCNC: 3.6 G/DL (ref 3.4–5)
ALBUMIN/GLOB SERPL: 1 {RATIO} (ref 1–2)
ALP LIVER SERPL-CCNC: 32 U/L
ALT SERPL-CCNC: 28 U/L
ANION GAP SERPL CALC-SCNC: 7 MMOL/L (ref 0–18)
AST SERPL-CCNC: 17 U/L (ref 15–37)
BASOPHILS # BLD AUTO: 0.03 X10(3) UL (ref 0–0.2)
BASOPHILS NFR BLD AUTO: 0.3 %
BILIRUB SERPL-MCNC: 0.4 MG/DL (ref 0.1–2)
BUN BLD-MCNC: 14 MG/DL (ref 9–23)
CALCIUM BLD-MCNC: 8.6 MG/DL (ref 8.5–10.1)
CHLORIDE SERPL-SCNC: 110 MMOL/L (ref 98–112)
CHOLEST SERPL-MCNC: 167 MG/DL (ref ?–200)
CO2 SERPL-SCNC: 23 MMOL/L (ref 21–32)
CREAT BLD-MCNC: 0.8 MG/DL
EGFRCR SERPLBLD CKD-EPI 2021: 98 ML/MIN/1.73M2 (ref 60–?)
EOSINOPHIL # BLD AUTO: 0.08 X10(3) UL (ref 0–0.7)
EOSINOPHIL NFR BLD AUTO: 0.9 %
ERYTHROCYTE [DISTWIDTH] IN BLOOD BY AUTOMATED COUNT: 13 %
EST. AVERAGE GLUCOSE BLD GHB EST-MCNC: 103 MG/DL (ref 68–126)
FASTING PATIENT LIPID ANSWER: NO
FASTING STATUS PATIENT QL REPORTED: NO
GGT SERPL-CCNC: 20 U/L
GLOBULIN PLAS-MCNC: 3.5 G/DL (ref 2.8–4.4)
GLUCOSE BLD-MCNC: 98 MG/DL (ref 70–99)
HBA1C MFR BLD: 5.2 % (ref ?–5.7)
HCT VFR BLD AUTO: 40.1 %
HCV AB SERPL QL IA: NONREACTIVE
HDLC SERPL-MCNC: 57 MG/DL (ref 40–59)
HGB BLD-MCNC: 12.9 G/DL
IMM GRANULOCYTES # BLD AUTO: 0.03 X10(3) UL (ref 0–1)
IMM GRANULOCYTES NFR BLD: 0.3 %
LDLC SERPL CALC-MCNC: 84 MG/DL (ref ?–100)
LYMPHOCYTES # BLD AUTO: 2.32 X10(3) UL (ref 1–4)
LYMPHOCYTES NFR BLD AUTO: 26.4 %
MAGNESIUM SERPL-MCNC: 2.3 MG/DL (ref 1.6–2.6)
MCH RBC QN AUTO: 30.3 PG (ref 26–34)
MCHC RBC AUTO-ENTMCNC: 32.2 G/DL (ref 31–37)
MCV RBC AUTO: 94.1 FL
MONOCYTES # BLD AUTO: 0.45 X10(3) UL (ref 0.1–1)
MONOCYTES NFR BLD AUTO: 5.1 %
NEUTROPHILS # BLD AUTO: 5.87 X10 (3) UL (ref 1.5–7.7)
NEUTROPHILS # BLD AUTO: 5.87 X10(3) UL (ref 1.5–7.7)
NEUTROPHILS NFR BLD AUTO: 67 %
NONHDLC SERPL-MCNC: 110 MG/DL (ref ?–130)
OSMOLALITY SERPL CALC.SUM OF ELEC: 290 MOSM/KG (ref 275–295)
PLATELET # BLD AUTO: 261 10(3)UL (ref 150–450)
POTASSIUM SERPL-SCNC: 3.9 MMOL/L (ref 3.5–5.1)
PROT SERPL-MCNC: 7.1 G/DL (ref 6.4–8.2)
RBC # BLD AUTO: 4.26 X10(6)UL
SODIUM SERPL-SCNC: 140 MMOL/L (ref 136–145)
TRIGL SERPL-MCNC: 151 MG/DL (ref 30–149)
TSI SER-ACNC: 1.2 MIU/ML (ref 0.36–3.74)
VLDLC SERPL CALC-MCNC: 24 MG/DL (ref 0–30)
WBC # BLD AUTO: 8.8 X10(3) UL (ref 4–11)

## 2024-05-16 PROCEDURE — 83735 ASSAY OF MAGNESIUM: CPT | Performed by: FAMILY MEDICINE

## 2024-05-16 PROCEDURE — 80050 GENERAL HEALTH PANEL: CPT | Performed by: FAMILY MEDICINE

## 2024-05-16 PROCEDURE — 86803 HEPATITIS C AB TEST: CPT | Performed by: FAMILY MEDICINE

## 2024-05-16 PROCEDURE — 99395 PREV VISIT EST AGE 18-39: CPT | Performed by: FAMILY MEDICINE

## 2024-05-16 PROCEDURE — 3008F BODY MASS INDEX DOCD: CPT | Performed by: FAMILY MEDICINE

## 2024-05-16 PROCEDURE — 3074F SYST BP LT 130 MM HG: CPT | Performed by: FAMILY MEDICINE

## 2024-05-16 PROCEDURE — 82977 ASSAY OF GGT: CPT | Performed by: FAMILY MEDICINE

## 2024-05-16 PROCEDURE — 80061 LIPID PANEL: CPT | Performed by: FAMILY MEDICINE

## 2024-05-16 PROCEDURE — 3078F DIAST BP <80 MM HG: CPT | Performed by: FAMILY MEDICINE

## 2024-05-16 PROCEDURE — 83036 HEMOGLOBIN GLYCOSYLATED A1C: CPT | Performed by: FAMILY MEDICINE

## 2024-05-16 NOTE — PROGRESS NOTES
Aurelia Tran is a 34 year old female.    CC:    Chief Complaint   Patient presents with    Physical     No pap        Subjective:      Chief Complaint Reviewed and Verified  Nursing Notes Reviewed and   Verified  Tobacco Reviewed  Allergies Reviewed  Medications Reviewed    Problem List Reviewed  Medical History Reviewed  Surgical History   Reviewed  OB Status Reviewed  Family History Reviewed  Social History   Reviewed           HPI:  Here for routine  Has lost a lot of weight  Feels well  States this is due to recent divorce and 2 jobs   she is on her feet a lot at the 2 jobs    She feels very good and this is not a health issue  Notes she has no swelling of the ankles since weight loss  Sleeps well    No known injury    Has some concern regarding fingernails  But also 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       History:  Past Medical History:    Fatty liver disease, nonalcoholic      Past Surgical History:   Procedure Laterality Date    Appendectomy              Family History   Problem Relation Age of Onset    Other (Other[other]) Mother         GERD    Cancer Maternal Grandmother 50        [place] colon cancer    Heart Disorder Maternal Grandfather       Family Status   Relation Status    MGMA     MGFA     Mo (Not Specified)      Social History     Socioeconomic History    Marital status: Single   Tobacco Use    Smoking status: Never    Smokeless tobacco: Never   Vaping Use    Vaping status: Never Used   Substance and Sexual Activity    Alcohol use: No     Alcohol/week: 0.0  standard drinks of alcohol    Drug use: No    Sexual activity: Yes     Birth control/protection: Pill   Social History Narrative    ** Merged History Encounter **                 Immunization History   Administered Date(s) Administered    Covid-19 Vaccine Moderna 100 mcg/0.5 ml 07/16/2021, 08/20/2021    DTAP 03/25/1989, 05/27/1989, 09/13/1989, 08/15/1990, 01/22/1994    FLUZONE 6 months and older PFS 0.5 ml (04437) 11/10/2021    Flublok Quad Influenza Vaccine (10319) 10/24/2020, 10/23/2022    HEP B 09/15/1998, 10/20/1998, 04/13/1999    HEP B, Ped/Adol 09/15/1998, 10/20/1998, 04/13/1999    HIB 08/15/1990    Hib, Unspecified Formulation 08/15/1990    Influenza 09/27/2017, 10/26/2019, 10/24/2020    MMR 04/27/1990, 01/22/1994    OPV 03/25/1989, 05/27/1989, 08/15/1990, 01/22/1994    TDAP 10/10/2017           Wt Readings from Last 6 Encounters:   05/16/24 200 lb 4 oz (90.8 kg)   01/15/24 245 lb (111.1 kg)   04/08/23 247 lb 6 oz (112.2 kg)   01/08/22 246 lb 6 oz (111.8 kg)   07/10/21 239 lb 4 oz (108.5 kg)   01/23/21 233 lb 8 oz (105.9 kg)       BP Readings from Last 3 Encounters:   05/16/24 110/60   01/15/24 116/66   04/08/23 118/60     REVIEW OF SYSTEMS:   GENERAL: feels well otherwise  SKIN: denies any unusual skin lesions  EYES:denies blurred vision or double vision  HEENT: denies nasal congestion, sinus pain or ST  LUNGS: denies shortness of breath with exertion  CARDIOVASCULAR: denies chest pain on exertion  GI: denies abdominal pain,denies heartburn   : denies dysuria or changes in stream or incontinence  MUSCULOSKELETAL: denies back pain  NEURO: denies headaches  PSYCHE: denies depression or anxiety  HEMATOLOGIC: denies hx of anemia  Objective:    EXAM:   Blood pressure 110/60, pulse 78, temperature 97.6 °F (36.4 °C), temperature source Temporal, resp. rate 12, height 5' 5.75\" (1.67 m), weight 200 lb 4 oz (90.8 kg), last menstrual period 05/07/2024, SpO2 98%.  Body mass index is 32.57 kg/m².  Patient's last  menstrual period was 05/07/2024.    Reviewed by Dr Driver    GENERAL: well developed, well nourished,in no apparent distress  SKIN: no rashes,no suspicious lesions  HEENT: atraumatic, normocephalic,ears and throat are clear  EYES:PERRL, EOMI, conjunctiva are clear  NECK: supple,no adenopathy,no bruits  CHEST: no chest tenderness  BREAST: defer  LUNGS: clear to auscultation  CARDIO: RRR without murmur  GI: good BS's,no masses, HSM or tenderness  : defer  RECTAL:defer  MUSCULOSKELETAL: back is not tender,FROM of the back  EXTREMITIES: no cyanosis, clubbing or edema  NEURO: Oriented times three,cranial nerves are intact,motor and sensory are grossly intact      Assessment & Plan:       ASSESSMENT:  1. Well adult exam (Primary)  2. Fatty liver disease, nonalcoholic  Comments:  nl lfts in last yr  john ck again today  Orders:  -     GGT (Gamma Glutamyl Transpeptidase); Future; Expected date: 05/16/2024  3. Obesity (BMI 30.0-34.9)  Overview:  Estimated body mass index is 32.57 kg/m² as calculated from the following:    Height as of this encounter: 5' 5.75\" (1.67 m).    Weight as of this encounter: 200 lb 4 oz (90.8 kg).    Assessment & Plan:  40+ lb weight loss recently  healthy  feels well  continue healthy weight loss  4. Screening for diabetes mellitus  -     Comp Metabolic Panel (14); Future; Expected date: 05/16/2024  -     Hemoglobin A1C; Future; Expected date: 05/16/2024  5. Screening for iron deficiency anemia  -     CBC With Differential With Platelet; Future; Expected date: 05/16/2024  6. Screening for thyroid disorder  -     TSH W Reflex To Free T4; Future; Expected date: 05/16/2024  7. Screening for cardiovascular condition  -     Lipid Panel; Future; Expected date: 05/16/2024  8. Encounter for hepatitis C screening test for low risk patient  -     HCV Antibody; Future; Expected date: 05/16/2024  9. Laboratory examination ordered as part of a routine general medical examination  -     TSH W Reflex To Free  T4; Future; Expected date: 05/16/2024  -     Lipid Panel; Future; Expected date: 05/16/2024  -     CBC With Differential With Platelet; Future; Expected date: 05/16/2024  -     Comp Metabolic Panel (14); Future; Expected date: 05/16/2024  -     Hemoglobin A1C; Future; Expected date: 05/16/2024  -     GGT (Gamma Glutamyl Transpeptidase); Future; Expected date: 05/16/2024  -     Magnesium; Future; Expected date: 05/16/2024            Meds & Refills for this Visit:  Requested Prescriptions      No prescriptions requested or ordered in this encounter

## (undated) NOTE — MR AVS SNAPSHOT
[facility]  [address] [address]  [phone]               Thank you for choosing us for your health care visit with Keerthi Meyers DO.   We are glad to serve you and happy to provide you with this summ Eat plenty of protein, keep the fat content low Sugars:  sodas and sports drinks, candies and desserts   Eat plenty of low-fat dairy products High fat meats and dairy   Choose whole grain products Foods high in sodium   Water is best for hydration Fast june

## (undated) NOTE — LETTER
[address] [place] [id number]  [phone]        Date: 7/31/2017      Patient Name: Leona Bunnbridget            To Whom it may concern:     This letter has been written at the [facility]